# Patient Record
Sex: MALE | Race: WHITE | Employment: OTHER | URBAN - METROPOLITAN AREA
[De-identification: names, ages, dates, MRNs, and addresses within clinical notes are randomized per-mention and may not be internally consistent; named-entity substitution may affect disease eponyms.]

---

## 2017-01-01 ENCOUNTER — DOCUMENTATION ONLY (OUTPATIENT)
Dept: ENDOCRINOLOGY | Age: 82
End: 2017-01-01

## 2017-01-01 ENCOUNTER — OFFICE VISIT (OUTPATIENT)
Dept: ENDOCRINOLOGY | Age: 82
End: 2017-01-01

## 2017-01-01 VITALS
OXYGEN SATURATION: 97 % | TEMPERATURE: 97 F | RESPIRATION RATE: 16 BRPM | HEIGHT: 74 IN | HEART RATE: 63 BPM | SYSTOLIC BLOOD PRESSURE: 113 MMHG | DIASTOLIC BLOOD PRESSURE: 48 MMHG | BODY MASS INDEX: 31.28 KG/M2 | WEIGHT: 243.7 LBS

## 2017-01-01 DIAGNOSIS — Z79.4 TYPE 2 DIABETES MELLITUS WITH HYPERGLYCEMIA, WITH LONG-TERM CURRENT USE OF INSULIN (HCC): Primary | ICD-10-CM

## 2017-01-01 DIAGNOSIS — E11.65 TYPE 2 DIABETES MELLITUS WITH HYPERGLYCEMIA, WITH LONG-TERM CURRENT USE OF INSULIN (HCC): Primary | ICD-10-CM

## 2017-01-01 DIAGNOSIS — T14.8XXA ANIMAL BITE: ICD-10-CM

## 2017-01-01 DIAGNOSIS — N18.4 STAGE 4 CHRONIC KIDNEY DISEASE (HCC): ICD-10-CM

## 2017-01-01 DIAGNOSIS — M81.0 SENILE OSTEOPOROSIS: ICD-10-CM

## 2017-01-01 DIAGNOSIS — I73.9 PAD (PERIPHERAL ARTERY DISEASE) (HCC): ICD-10-CM

## 2017-01-01 LAB
HBA1C MFR BLD HPLC: 7.5 %
HBA1C MFR BLD HPLC: 7.9 %

## 2017-01-01 RX ORDER — CEPHALEXIN 250 MG/1
250 CAPSULE ORAL 3 TIMES DAILY
Qty: 21 CAP | Refills: 0 | Status: SHIPPED | OUTPATIENT
Start: 2017-01-01

## 2017-01-01 RX ORDER — PEN NEEDLE, DIABETIC 31 GX5/16"
NEEDLE, DISPOSABLE MISCELLANEOUS
Qty: 360 PEN NEEDLE | Refills: 21 | Status: SHIPPED | OUTPATIENT
Start: 2017-01-01

## 2017-01-01 RX ORDER — ERGOCALCIFEROL 1.25 MG/1
CAPSULE ORAL
Qty: 15 CAP | Refills: 0 | Status: SHIPPED | OUTPATIENT
Start: 2017-01-01 | End: 2018-01-01 | Stop reason: SDUPTHER

## 2017-01-01 RX ORDER — ERGOCALCIFEROL 1.25 MG/1
CAPSULE ORAL
Qty: 15 CAP | Refills: 0 | Status: SHIPPED | OUTPATIENT
Start: 2017-01-01 | End: 2017-01-01 | Stop reason: SDUPTHER

## 2017-01-01 RX ORDER — GABAPENTIN 100 MG/1
CAPSULE ORAL
Qty: 360 CAP | Refills: 4 | Status: SHIPPED | OUTPATIENT
Start: 2017-01-01

## 2017-01-01 RX ORDER — DULOXETIN HYDROCHLORIDE 30 MG/1
CAPSULE, DELAYED RELEASE ORAL
Qty: 90 CAP | Refills: 4 | Status: SHIPPED | OUTPATIENT
Start: 2017-01-01

## 2017-01-02 RX ORDER — DULOXETIN HYDROCHLORIDE 30 MG/1
CAPSULE, DELAYED RELEASE ORAL
Qty: 90 CAP | Refills: 1 | Status: SHIPPED | OUTPATIENT
Start: 2017-01-02 | End: 2017-01-01 | Stop reason: SDUPTHER

## 2017-01-04 ENCOUNTER — APPOINTMENT (OUTPATIENT)
Dept: GENERAL RADIOLOGY | Age: 82
End: 2017-01-04
Attending: PHYSICIAN ASSISTANT
Payer: MEDICARE

## 2017-01-04 ENCOUNTER — HOSPITAL ENCOUNTER (EMERGENCY)
Age: 82
Discharge: OTHER INSTITUTION | End: 2017-01-04
Attending: EMERGENCY MEDICINE
Payer: MEDICARE

## 2017-01-04 VITALS
SYSTOLIC BLOOD PRESSURE: 100 MMHG | DIASTOLIC BLOOD PRESSURE: 54 MMHG | BODY MASS INDEX: 31.85 KG/M2 | HEART RATE: 61 BPM | WEIGHT: 248.19 LBS | OXYGEN SATURATION: 96 % | RESPIRATION RATE: 17 BRPM | HEIGHT: 74 IN

## 2017-01-04 DIAGNOSIS — J96.02 ACUTE RESPIRATORY FAILURE WITH HYPOXIA AND HYPERCAPNIA (HCC): Primary | ICD-10-CM

## 2017-01-04 DIAGNOSIS — J96.01 ACUTE RESPIRATORY FAILURE WITH HYPOXIA AND HYPERCAPNIA (HCC): Primary | ICD-10-CM

## 2017-01-04 LAB
ALBUMIN SERPL BCP-MCNC: 3.6 G/DL (ref 3.5–5)
ALBUMIN/GLOB SERPL: 0.9 {RATIO} (ref 1.1–2.2)
ALP SERPL-CCNC: 75 U/L (ref 45–117)
ALT SERPL-CCNC: 34 U/L (ref 12–78)
ANION GAP BLD CALC-SCNC: 15 MMOL/L (ref 5–15)
APPEARANCE UR: CLEAR
APTT PPP: 31 SEC (ref 22.1–32.5)
AST SERPL W P-5'-P-CCNC: 35 U/L (ref 15–37)
ATRIAL RATE: 208 BPM
ATRIAL RATE: 87 BPM
BACTERIA URNS QL MICRO: ABNORMAL /HPF
BASOPHILS # BLD AUTO: 0.1 K/UL (ref 0–0.1)
BASOPHILS # BLD: 1 % (ref 0–1)
BILIRUB SERPL-MCNC: 0.7 MG/DL (ref 0.2–1)
BILIRUB UR QL: NEGATIVE
BUN SERPL-MCNC: 44 MG/DL (ref 6–20)
BUN/CREAT SERPL: 18 (ref 12–20)
CALCIUM SERPL-MCNC: 9 MG/DL (ref 8.5–10.1)
CALCULATED R AXIS, ECG10: 7 DEGREES
CALCULATED R AXIS, ECG10: 87 DEGREES
CALCULATED T AXIS, ECG11: -178 DEGREES
CALCULATED T AXIS, ECG11: 139 DEGREES
CHLORIDE SERPL-SCNC: 98 MMOL/L (ref 97–108)
CO2 SERPL-SCNC: 24 MMOL/L (ref 21–32)
COLOR UR: ABNORMAL
CREAT SERPL-MCNC: 2.42 MG/DL (ref 0.7–1.3)
DIAGNOSIS, 93000: NORMAL
DIAGNOSIS, 93000: NORMAL
DIFFERENTIAL METHOD BLD: ABNORMAL
EOSINOPHIL # BLD: 0.3 K/UL (ref 0–0.4)
EOSINOPHIL NFR BLD: 3 % (ref 0–7)
EPITH CASTS URNS QL MICRO: ABNORMAL /LPF
ERYTHROCYTE [DISTWIDTH] IN BLOOD BY AUTOMATED COUNT: 16 % (ref 11.5–14.5)
GLOBULIN SER CALC-MCNC: 4.2 G/DL (ref 2–4)
GLUCOSE BLD STRIP.AUTO-MCNC: 215 MG/DL (ref 65–100)
GLUCOSE SERPL-MCNC: 157 MG/DL (ref 65–100)
GLUCOSE UR STRIP.AUTO-MCNC: 250 MG/DL
HCT VFR BLD AUTO: 41.4 % (ref 36.6–50.3)
HGB BLD-MCNC: 12.8 G/DL (ref 12.1–17)
HGB UR QL STRIP: ABNORMAL
INR PPP: 1.3 (ref 0.9–1.1)
KETONES UR QL STRIP.AUTO: NEGATIVE MG/DL
LEUKOCYTE ESTERASE UR QL STRIP.AUTO: NEGATIVE
LYMPHOCYTES # BLD AUTO: 13 % (ref 12–49)
LYMPHOCYTES # BLD: 1.3 K/UL
MAGNESIUM SERPL-MCNC: 2.4 MG/DL (ref 1.6–2.4)
MCH RBC QN AUTO: 29.3 PG (ref 26–34)
MCHC RBC AUTO-ENTMCNC: 30.9 G/DL (ref 30–36.5)
MCV RBC AUTO: 94.7 FL (ref 80–99)
MONOCYTES # BLD: 0.9 K/UL (ref 0–1)
MONOCYTES NFR BLD AUTO: 9 % (ref 5–13)
NEUTS SEG # BLD: 7.6 K/UL (ref 1.8–8)
NEUTS SEG NFR BLD AUTO: 74 % (ref 32–75)
NITRITE UR QL STRIP.AUTO: NEGATIVE
PH UR STRIP: 6 [PH] (ref 5–8)
PLATELET # BLD AUTO: 225 K/UL (ref 150–400)
POTASSIUM SERPL-SCNC: 4.4 MMOL/L (ref 3.5–5.1)
PROT SERPL-MCNC: 7.8 G/DL (ref 6.4–8.2)
PROT UR STRIP-MCNC: >300 MG/DL
PROTHROMBIN TIME: 12.7 SEC (ref 9–11.1)
Q-T INTERVAL, ECG07: 356 MS
Q-T INTERVAL, ECG07: 456 MS
QRS DURATION, ECG06: 106 MS
QRS DURATION, ECG06: 124 MS
QTC CALCULATION (BEZET), ECG08: 468 MS
QTC CALCULATION (BEZET), ECG08: 478 MS
RBC # BLD AUTO: 4.37 M/UL (ref 4.1–5.7)
RBC #/AREA URNS HPF: ABNORMAL /HPF (ref 0–5)
SERVICE CMNT-IMP: ABNORMAL
SODIUM SERPL-SCNC: 137 MMOL/L (ref 136–145)
SP GR UR REFRACTOMETRY: 1.02 (ref 1–1.03)
THERAPEUTIC RANGE,PTTT: NORMAL SECS (ref 58–77)
TROPONIN I BLD-MCNC: <0.04 NG/ML (ref 0–0.08)
TROPONIN I SERPL-MCNC: 0.09 NG/ML
UA: UC IF INDICATED,UAUC: ABNORMAL
UROBILINOGEN UR QL STRIP.AUTO: 0.2 EU/DL (ref 0.2–1)
VENTRICULAR RATE, ECG03: 104 BPM
VENTRICULAR RATE, ECG03: 66 BPM
WBC # BLD AUTO: 10.1 K/UL (ref 4.1–11.1)
WBC URNS QL MICRO: ABNORMAL /HPF (ref 0–4)

## 2017-01-04 PROCEDURE — 85025 COMPLETE CBC W/AUTO DIFF WBC: CPT | Performed by: EMERGENCY MEDICINE

## 2017-01-04 PROCEDURE — C1751 CATH, INF, PER/CENT/MIDLINE: HCPCS

## 2017-01-04 PROCEDURE — 85730 THROMBOPLASTIN TIME PARTIAL: CPT | Performed by: EMERGENCY MEDICINE

## 2017-01-04 PROCEDURE — 93005 ELECTROCARDIOGRAM TRACING: CPT

## 2017-01-04 PROCEDURE — 77030019563 HC DEV ATTCH FEED HOLL -A

## 2017-01-04 PROCEDURE — 96365 THER/PROPH/DIAG IV INF INIT: CPT

## 2017-01-04 PROCEDURE — 99285 EMERGENCY DEPT VISIT HI MDM: CPT

## 2017-01-04 PROCEDURE — 94002 VENT MGMT INPAT INIT DAY: CPT

## 2017-01-04 PROCEDURE — 80053 COMPREHEN METABOLIC PANEL: CPT | Performed by: EMERGENCY MEDICINE

## 2017-01-04 PROCEDURE — 74011000258 HC RX REV CODE- 258: Performed by: EMERGENCY MEDICINE

## 2017-01-04 PROCEDURE — 87086 URINE CULTURE/COLONY COUNT: CPT | Performed by: EMERGENCY MEDICINE

## 2017-01-04 PROCEDURE — 74011250636 HC RX REV CODE- 250/636: Performed by: EMERGENCY MEDICINE

## 2017-01-04 PROCEDURE — 77030008683 HC TU ET CUF COVD -A

## 2017-01-04 PROCEDURE — 74011000250 HC RX REV CODE- 250: Performed by: EMERGENCY MEDICINE

## 2017-01-04 PROCEDURE — 77030034848

## 2017-01-04 PROCEDURE — 84484 ASSAY OF TROPONIN QUANT: CPT | Performed by: EMERGENCY MEDICINE

## 2017-01-04 PROCEDURE — 85610 PROTHROMBIN TIME: CPT | Performed by: EMERGENCY MEDICINE

## 2017-01-04 PROCEDURE — 31500 INSERT EMERGENCY AIRWAY: CPT

## 2017-01-04 PROCEDURE — 77030018729 HC ELECTRD DEFIB PAD CARD -B

## 2017-01-04 PROCEDURE — 71010 XR CHEST PORT: CPT

## 2017-01-04 PROCEDURE — 77030005546 HC CATH URETH FOL 3W BARD -A

## 2017-01-04 PROCEDURE — 96375 TX/PRO/DX INJ NEW DRUG ADDON: CPT

## 2017-01-04 PROCEDURE — 77030034850

## 2017-01-04 PROCEDURE — 82803 BLOOD GASES ANY COMBINATION: CPT

## 2017-01-04 PROCEDURE — 75810000137 HC PLCMT CENT VENOUS CATH

## 2017-01-04 PROCEDURE — 96367 TX/PROPH/DG ADDL SEQ IV INF: CPT

## 2017-01-04 PROCEDURE — 81001 URINALYSIS AUTO W/SCOPE: CPT | Performed by: EMERGENCY MEDICINE

## 2017-01-04 PROCEDURE — 77030008771 HC TU NG SALEM SUMP -A

## 2017-01-04 PROCEDURE — 83735 ASSAY OF MAGNESIUM: CPT | Performed by: EMERGENCY MEDICINE

## 2017-01-04 PROCEDURE — 82962 GLUCOSE BLOOD TEST: CPT

## 2017-01-04 PROCEDURE — 74011250636 HC RX REV CODE- 250/636

## 2017-01-04 RX ORDER — SODIUM CHLORIDE 9 MG/ML
125 INJECTION, SOLUTION INTRAVENOUS CONTINUOUS
Status: DISCONTINUED | OUTPATIENT
Start: 2017-01-04 | End: 2017-01-04 | Stop reason: HOSPADM

## 2017-01-04 RX ORDER — PROPOFOL 10 MG/ML
INJECTION, EMULSION INTRAVENOUS
Status: COMPLETED
Start: 2017-01-04 | End: 2017-01-04

## 2017-01-04 RX ORDER — MIDAZOLAM HYDROCHLORIDE 5 MG/ML
5 INJECTION INTRAMUSCULAR; INTRAVENOUS ONCE
Status: DISCONTINUED | OUTPATIENT
Start: 2017-01-04 | End: 2017-01-04 | Stop reason: HOSPADM

## 2017-01-04 RX ORDER — MIDAZOLAM HYDROCHLORIDE 5 MG/ML
5 INJECTION INTRAMUSCULAR; INTRAVENOUS ONCE
Status: COMPLETED | OUTPATIENT
Start: 2017-01-04 | End: 2017-01-04

## 2017-01-04 RX ORDER — SODIUM CHLORIDE 9 MG/ML
999 INJECTION, SOLUTION INTRAVENOUS ONCE
Status: DISCONTINUED | OUTPATIENT
Start: 2017-01-04 | End: 2017-01-04 | Stop reason: HOSPADM

## 2017-01-04 RX ORDER — MIDAZOLAM HYDROCHLORIDE 1 MG/ML
INJECTION, SOLUTION INTRAMUSCULAR; INTRAVENOUS
Status: COMPLETED
Start: 2017-01-04 | End: 2017-01-04

## 2017-01-04 RX ORDER — ROCURONIUM BROMIDE 10 MG/ML
100 INJECTION, SOLUTION INTRAVENOUS
Status: COMPLETED | OUTPATIENT
Start: 2017-01-04 | End: 2017-01-04

## 2017-01-04 RX ADMIN — PROPOFOL 20 MCG/KG/MIN: 10 INJECTION, EMULSION INTRAVENOUS at 14:22

## 2017-01-04 RX ADMIN — SODIUM CHLORIDE 1000 ML: 900 INJECTION, SOLUTION INTRAVENOUS at 14:27

## 2017-01-04 RX ADMIN — MIDAZOLAM HYDROCHLORIDE 2 MG/HR: 5 INJECTION, SOLUTION INTRAMUSCULAR; INTRAVENOUS at 15:35

## 2017-01-04 RX ADMIN — MIDAZOLAM HYDROCHLORIDE 5 MG: 5 INJECTION INTRAMUSCULAR; INTRAVENOUS at 14:54

## 2017-01-04 RX ADMIN — PROPOFOL 10 MCG/KG/MIN: 10 INJECTION, EMULSION INTRAVENOUS at 15:00

## 2017-01-04 RX ADMIN — SODIUM CHLORIDE 125 ML/HR: 900 INJECTION, SOLUTION INTRAVENOUS at 15:33

## 2017-01-04 RX ADMIN — MIDAZOLAM HYDROCHLORIDE 5 MG: 1 INJECTION, SOLUTION INTRAMUSCULAR; INTRAVENOUS at 14:26

## 2017-01-04 RX ADMIN — ROCURONIUM BROMIDE 100 MG: 10 INJECTION INTRAVENOUS at 16:06

## 2017-01-04 NOTE — ED NOTES
TRANSFER - OUT REPORT:    Verbal report given to NICK Camp with CCT(name) on Ramin Purdy  being transferred to [facility] ICU Rm 7(unit) for routine progression of care       Report consisted of patients Situation, Background, Assessment and   Recommendations(SBAR). Information from the following report(s) ED Summary was reviewed with the receiving nurse. Lines:   Triple Lumen 7fr TLC 01/04/17 Right Internal jugular (Active)   Central Line Being Utilized No 1/4/2017  2:42 PM   Site Assessment Clean, dry, & intact 1/4/2017  2:42 PM   Infiltration Assessment 0 1/4/2017  2:42 PM   Date of Last Dressing Change 01/04/17 1/4/2017  2:42 PM   Dressing Status Clean, dry, & intact 1/4/2017  2:42 PM   Positive Blood Return (Medial Site) Yes 1/4/2017  2:42 PM   Positive Blood Return (Lateral Site) Yes 1/4/2017  2:42 PM   Positive Blood Return (Site #3) Yes 1/4/2017  2:42 PM   External Catheter Length (cm) 24 centimeters 1/4/2017  2:42 PM       Peripheral IV 01/04/17 Right Forearm (Active)   Site Assessment Clean, dry, & intact 1/4/2017  2:21 PM   Phlebitis Assessment 0 1/4/2017  2:21 PM   Infiltration Assessment 0 1/4/2017  2:21 PM   Dressing Status Clean, dry, & intact 1/4/2017  2:21 PM   Dressing Type Transparent 1/4/2017  2:21 PM   Hub Color/Line Status Pink 1/4/2017  2:21 PM       Peripheral IV 01/04/17 Right Antecubital (Active)   Site Assessment Clean, dry, & intact 1/4/2017  2:22 PM   Phlebitis Assessment 0 1/4/2017  2:22 PM   Infiltration Assessment 0 1/4/2017  2:22 PM   Dressing Status Clean, dry, & intact 1/4/2017  2:22 PM        Opportunity for questions and clarification was provided.       Patient transported with:   Critical Care Transport Team

## 2017-01-04 NOTE — ED NOTES
TRANSFER - OUT REPORT:    Telephone Verbal report given to [facility] NICK MEEKS(name) on Rosio Purdy  being transferred to [facility] ICU RM 7(unit) for routine progression of care       Report consisted of patients Situation, Background, Assessment and   Recommendations(SBAR). Information from the following report(s) ED Summary was reviewed with the receiving nurse. Lines:   Triple Lumen 7fr TLC 01/04/17 Right Internal jugular (Active)   Central Line Being Utilized No 1/4/2017  2:42 PM   Site Assessment Clean, dry, & intact 1/4/2017  2:42 PM   Infiltration Assessment 0 1/4/2017  2:42 PM   Date of Last Dressing Change 01/04/17 1/4/2017  2:42 PM   Dressing Status Clean, dry, & intact 1/4/2017  2:42 PM   Positive Blood Return (Medial Site) Yes 1/4/2017  2:42 PM   Positive Blood Return (Lateral Site) Yes 1/4/2017  2:42 PM   Positive Blood Return (Site #3) Yes 1/4/2017  2:42 PM   External Catheter Length (cm) 24 centimeters 1/4/2017  2:42 PM       Peripheral IV 01/04/17 Right Forearm (Active)   Site Assessment Clean, dry, & intact 1/4/2017  2:21 PM   Phlebitis Assessment 0 1/4/2017  2:21 PM   Infiltration Assessment 0 1/4/2017  2:21 PM   Dressing Status Clean, dry, & intact 1/4/2017  2:21 PM   Dressing Type Transparent 1/4/2017  2:21 PM   Hub Color/Line Status Pink 1/4/2017  2:21 PM       Peripheral IV 01/04/17 Right Antecubital (Active)   Site Assessment Clean, dry, & intact 1/4/2017  2:22 PM   Phlebitis Assessment 0 1/4/2017  2:22 PM   Infiltration Assessment 0 1/4/2017  2:22 PM   Dressing Status Clean, dry, & intact 1/4/2017  2:22 PM        Opportunity for questions and clarification was provided.       Patient transported with:   Critical Care Transport Team

## 2017-01-04 NOTE — ED PROVIDER NOTES
HPI   79 yo WM presents with sob. Pt has severe COPD on 2L oxygen by NC 24/7. Pt was coming to Dr. Geovanni Arroyo (vascular surgery) office for surgery f/u. Per wife, pt was c/o sob this morning, then worsening as he was walking into the doctor's office. Pt was seen in the office, then became more short winded when walking to the car. Per wife, his oxygen tank ran out. They tried to replace it and get him in the car. Once in the car, he began with some shaking and sob, became unresponsive. No hx of trauma or injury. Pt pulled from car by ED nursing staff. Pt unresponsive upon arrival and unable to give any history. Past Medical History:   Diagnosis Date    Arthritis     BPH (benign prostatic hyperplasia)     CAD (coronary artery disease)     Chronic respiratory failure with hypoxia (HCC)      2L    CKD (chronic kidney disease), stage III     COPD (chronic obstructive pulmonary disease) (HCC)     Depression     Diastolic CHF, chronic (HCC)      triple bypass, 2002    DM type 2 causing neurological disease ([address]     DM type 2 causing renal disease ([address]     DM type 2 causing vascular disease ([address]     Essential hypertension     GERD (gastroesophageal reflux disease)     Hyperlipidemia     Nausea & vomiting     Neuropathy     KELSEA on CPAP     Osteoporosis 6/2011 and 12/2011     prolia shots    Pacemaker     Sick sinus syndrome [facility])      pacemaker 2010    Stroke [facility])      per Dr. Reid Payor note from 11/9/2016 \"TIA like episodes\" pradaxa increased.        Past Surgical History:   Procedure Laterality Date    Hx cataract removal      Hx tonsillectomy       child    Hx heent       surgery x3 right ear    Hx heart catheterization       triple bypass    Hx coronary artery bypass graft  03/2016     right leg, Dr. Teressa Zhou Hx cholecystectomy      Hx pacemaker  12/17/2010     Medtronic Dual Chamber pacemaker    Pr prostate biopsy, needle, saturation sampling      Hx orthopaedic       right hip, screws  Hx orthopaedic       reconstruction right ankle    Hx amputation Right 2016     4th toe right foot    Hx orthopaedic       hammertoe x2    Hx orthopaedic       screws in ankle         Family History:   Problem Relation Age of Onset    Diabetes Mother     Heart Attack Mother     Diabetes Father     Heart Attack Father     Diabetes Sister     Heart Attack Sister     Diabetes Brother     Heart Attack Brother     Diabetes Sister     Heart Attack Sister        Social History     Social History    Marital status:      Spouse name: N/A    Number of children: N/A    Years of education: N/A     Occupational History    Not on file.      Social History Main Topics    Smoking status: Former Smoker     Years: 40.00     Quit date: 1975    Smokeless tobacco: Never Used    Alcohol use No    Drug use: No    Sexual activity: Yes     Partners: Female     Other Topics Concern    Not on file     Social History Narrative         ALLERGIES: Pcn [penicillins]    Review of Systems   Unable to perform ROS: Intubated       Vitals:    01/04/17 1418 01/04/17 1421 01/04/17 1427 01/04/17 1430   BP: 169/88 (!) 171/126 114/70 (!) 133/91   Pulse: 93 (!) 106 81 83   Resp: 18 22 16 15   SpO2: 100% 93% 98% 99%            Physical Exam   Physical Examination: General appearance - unresponsive, perioral cyanosis  Eyes - pupils equal and reactive  Neck - supple, no significant adenopathy  Chest - agonal respirations, pacemaker left upper chest   Heart - unable to palpate pulse  Abdomen - obese, soft, nontender, nondistended  Neurological - unresponsive  Musculoskeletal - bandage to right foot c/d/i  Skin - cyanotic  MDM  Number of Diagnoses or Management Options  Acute respiratory failure with hypoxia and hypercapnia ([address]):      Amount and/or Complexity of Data Reviewed  Clinical lab tests: ordered and reviewed  Tests in the radiology section of CPT®: ordered and reviewed  Decide to obtain previous medical records or to obtain history from someone other than the patient: yes  Obtain history from someone other than the patient: yes (family)  Review and summarize past medical records: yes  Discuss the patient with other providers: yes (Cardiology, hospitalist)  Independent visualization of images, tracings, or specimens: yes    Critical Care  Total time providing critical care: 30-74 minutes    Patient Progress  Patient progress: improved    ED Course       INTUBATE PATIENT  Date/Time: 1/4/2017 2:17 PM  Performed by: Ger Vazquez  Authorized by: Ger Vazquez     Consent:     Consent obtained:  Emergent situation  Pre-procedure details:     Patient status:  Unresponsive    Pretreatment medications:  None    Paralytics:  None  Procedure details:     Preoxygenation:  Bag valve mask    CPR in progress: yes      Intubation method:  Oral    Oral intubation technique:  Direct    Laryngoscope blade: Mac 4    Tube size (mm):  7.5    Tube type:  Cuffed    Number of attempts:  1    Ventilation between attempts: yes      Cricoid pressure: no      Tube visualized through cords: yes    Placement assessment:     ETT to lip:  ET to gums 23    Tube secured with:  ETT barboza    Breath sounds:  Equal and absent over the epigastrium    Placement verification: chest rise, condensation, CXR verification, direct visualization, equal breath sounds and ETCO2 detector      CXR findings:  ETT in proper place  Post-procedure details:     Patient tolerance of procedure: Tolerated well, no immediate complications  CENTRAL VENOUS LINE INSERTION  Date/Time: 1/4/2017 2:45 PM  Performed by: Ger Vazquez  Authorized by: Michael Arevalo details:     Hand hygiene: Hand hygiene performed prior to insertion      Skin preparation:  ChloraPrep    Skin preparation agent: Skin preparation agent completely dried prior to procedure    Anesthesia (see MAR for exact dosages):      Anesthesia method:  Local infiltration    Local anesthetic: Lidocaine 1% w/o epi  Procedure details:     Location:  R internal jugular    Site selection rationale:  Pt on pradaxa, pacemaker left upper chest    Patient position:  Flat    Procedural supplies:  Triple lumen    Landmarks identified: yes      Ultrasound guidance: yes      Sterile ultrasound techniques: Sterile gel and sterile probe covers were used      Number of attempts:  1    Successful placement: yes    Post-procedure details:     Post-procedure:  Dressing applied and line sutured    Assessment:  Blood return through all ports, no pneumothorax on x-ray, free fluid flow and placement verified by x-ray    Patient tolerance of procedure: Tolerated well, no immediate complications      EKG interpretation: (Preliminary) 14:20  Rhythm: atrial fib; and irregular. Rate (approx.): 104; Axis: normal; slight ST elevation V2, t wave inversion V3-V6    EKG interpretation: (Preliminary) 15:08  Rhythm: atrial fib; and irregular. Rate (approx.): 66; Axis: normal; t wave inversion V2-V6, new from previous EKGs    Updated family on pt status. Family requests transfer to [facility] since pt is followed by cardiology and pulmonary there. Discussed with Dr. Regina Valencia, pt's cardiologist. Updated on pt's presentation and plan of care. Discussed with Dr. Elif Lugo, hospitalist at [address] [place] [address] pt for admission to ICU. [facility] critical care team at bedside, will transport pt to [facility].    Pt with respiratory acidosis due to copd. Sats 100% on vent. Vent settings adjusted to increase rate and decrease oxygen.

## 2017-01-04 NOTE — ED NOTES
Ventilator changes made based on ABG results. Central line placed by Dr Pooja Ordoñez using sterile technique. Pt remains sedated using Propofol.

## 2017-01-04 NOTE — ED TRIAGE NOTES
Pt received from POV hypoxic with agonal breathing. Immediate CPR initiated upon entering the treatment room. Dr Jason Orf immediately at bedside and assisting breathing via BVM.

## 2017-01-06 LAB
BACTERIA SPEC CULT: NORMAL
CC UR VC: NORMAL
SERVICE CMNT-IMP: NORMAL

## 2017-01-19 LAB — CREATININE, EXTERNAL: 1.72

## 2017-02-14 ENCOUNTER — OFFICE VISIT (OUTPATIENT)
Dept: ENDOCRINOLOGY | Age: 82
End: 2017-02-14

## 2017-02-14 VITALS
HEIGHT: 74 IN | BODY MASS INDEX: 30.67 KG/M2 | DIASTOLIC BLOOD PRESSURE: 57 MMHG | HEART RATE: 74 BPM | TEMPERATURE: 96.2 F | WEIGHT: 239 LBS | RESPIRATION RATE: 14 BRPM | OXYGEN SATURATION: 95 % | SYSTOLIC BLOOD PRESSURE: 121 MMHG

## 2017-02-14 DIAGNOSIS — M81.0 OSTEOPOROSIS, UNSPECIFIED: ICD-10-CM

## 2017-02-14 DIAGNOSIS — N18.30 CKD (CHRONIC KIDNEY DISEASE), STAGE III (HCC): ICD-10-CM

## 2017-02-14 DIAGNOSIS — J44.9 CHRONIC OBSTRUCTIVE PULMONARY DISEASE, UNSPECIFIED COPD TYPE (HCC): ICD-10-CM

## 2017-02-14 LAB
GLUCOSE POC: 132 MG/DL
HBA1C MFR BLD HPLC: 7.7 %

## 2017-02-14 RX ORDER — APIXABAN 5 MG/1
2.5 TABLET, FILM COATED ORAL
COMMUNITY
Start: 2017-02-09

## 2017-02-14 RX ORDER — NITROGLYCERIN 80 MG/1
PATCH TRANSDERMAL
Refills: 1 | COMMUNITY
Start: 2017-01-19

## 2017-02-14 NOTE — PROGRESS NOTES
prolia injection given in left arm.  Pt tolerated it well  Prolia 60 mg  [employer]   Lot [id number]  Exp 04/19  UlJeff Ayala 47 [phone]    Wt Readings from Last 3 Encounters:   02/14/17 239 lb (108.4 kg)   01/04/17 248 lb 3.1 oz (112.6 kg)   12/02/16 248 lb 3.2 oz (112.6 kg)     Temp Readings from Last 3 Encounters:   02/14/17 96.2 °F (35.7 °C) (Oral)   12/02/16 98.2 °F (36.8 °C)   11/23/16 97.7 °F (36.5 °C)     BP Readings from Last 3 Encounters:   02/14/17 121/57   01/04/17 100/54   12/02/16 113/56     Pulse Readings from Last 3 Encounters:   02/14/17 74   01/04/17 61   12/02/16 70     Lab Results   Component Value Date/Time    Hemoglobin A1c 7.1 11/16/2016 08:07 AM    Hemoglobin A1c (POC) 7.4 08/17/2016 09:54 AM

## 2017-02-14 NOTE — MR AVS SNAPSHOT
Visit Information Date & Time Provider Department Dept. Phone Encounter #  
 2/14/2017  9:15 AM Jaja Whaley MD [facility] [phone] [id number] Follow-up Instructions Return in about 6 months (around 8/14/2017) for visit and prolia. Upcoming Health Maintenance Date Due DTaP/Tdap/Td series (1 - Tdap) 3/24/1956 ZOSTER VACCINE AGE 60> 3/24/1995 GLAUCOMA SCREENING Q2Y 3/24/2000 MEDICARE YEARLY EXAM 3/24/2000 FOOT EXAM Q1 11/20/2015 EYE EXAM RETINAL OR DILATED Q1 6/4/2016 INFLUENZA AGE 9 TO ADULT 8/1/2016 HEMOGLOBIN A1C Q6M 5/16/2017 MICROALBUMIN Q1 11/16/2017 LIPID PANEL Q1 11/16/2017 Pneumococcal 65+ High/Highest Risk (2 of 2 - PPSV23) 11/12/2020 Allergies as of 2/14/2017  Review Complete On: 2/14/2017 By: Jaja Whaley MD  
  
 Severity Noted Reaction Type Reactions Pcn [Penicillins] Medium 09/24/2010    Hives Tolerates cephalexin Current Immunizations  Reviewed on 3/12/2016 Name Date Influenza Vaccine 10/12/2015 Pneumococcal Vaccine (Unspecified Type) 11/12/2015 Not reviewed this visit You Were Diagnosed With   
  
 Codes Comments Uncontrolled type 2 diabetes mellitus with complication, with long-term current use of insulin (HCC)    -  Primary ICD-10-CM: E11.8, E11.65, Z79.4 ICD-9-CM: 250.82, V58.67 Osteoporosis, unspecified     ICD-10-CM: M81.0 ICD-9-CM: 733.00 Vitals BP Pulse Temp Resp Height(growth percentile) Weight(growth percentile) 121/57 (BP 1 Location: Left arm, BP Patient Position: Sitting) 74 96.2 °F (35.7 °C) (Oral) 14 6' 2\" (1.88 m) 239 lb (108.4 kg) SpO2 BMI Smoking Status 95% 30.69 kg/m2 Former Smoker BMI and BSA Data Body Mass Index Body Surface Area  
 30.69 kg/m 2 2.38 m 2 Preferred Pharmacy Pharmacy Name [address] [facility] [phone] Your Updated Medication List  
  
   
This list is accurate as of: 2/14/17  9:48 AM.  Always use your most recent med list.  
  
  
  
  
 acetaminophen 500 mg tablet Commonly known as:  TYLENOL Take 1,000 mg by mouth three (3) times daily as needed for Pain. albuterol-ipratropium 2.5 mg-0.5 mg/3 ml Nebu Commonly known as:  DUO-NEB  
3 mL by Nebulization route every four (4) hours as needed. amLODIPine 2.5 mg tablet Commonly known as:  Horris Bills Take 2.5 mg by mouth daily (after dinner). aspirin delayed-release 81 mg tablet Take 81 mg by mouth daily (after breakfast). atorvastatin 10 mg tablet Commonly known as:  LIPITOR Take 10 mg by mouth nightly. BD INSULIN PEN NEEDLE UF SHORT 31 gauge x 5/16\" Ndle Generic drug:  Insulin Needles (Disposable) USE FOR INJECTIONS FOUR TIMES A DAY * bumetanide 1 mg tablet Commonly known as:  Arline Hung Take 1 mg by mouth daily (after breakfast). * bumetanide 1 mg tablet Commonly known as:  Arline Hung Take 1 mg by mouth daily as needed (fluid and swelling). BYSTOLIC 10 mg tablet Generic drug:  nebivolol Take 1 Tab by mouth daily. cephALEXin 500 mg capsule Commonly known as:  Larayne Marco Take 1 Cap by mouth three (3) times daily. DEXILANT 60 mg Cpdb Generic drug:  Dexlansoprazole Take 60 mg by mouth Daily (before breakfast). * DULoxetine 30 mg capsule Commonly known as:  CYMBALTA Take 30 mg by mouth daily (after dinner). * DULoxetine 30 mg capsule Commonly known as:  CYMBALTA TAKE 1 CAPSULE DAILY  
  
 ELIQUIS 5 mg tablet Generic drug:  apixaban  
  
 fenofibrate 54 mg tablet Commonly known as:  LOFIBRA TAKE 1 TABLET DAILY ferrous sulfate 325 mg (65 mg iron) tablet Take 325 mg by mouth daily (after breakfast). FLOMAX 0.4 mg capsule Generic drug:  tamsulosin Take 0.4 mg by mouth daily (after breakfast). gabapentin 100 mg capsule Commonly known as:  NEURONTIN Take 1 cap in morning, 1 cap in afternoon, and 2 caps at night  
  
 ibuprofen 600 mg tablet Commonly known as:  MOTRIN  
TAKE ONE TABLET BY MOUTH THREE TIMES DAILY  
  
 insulin glargine 100 unit/mL injection Commonly known as:  LANTUS INJECT 80 UNITS SUBCUTANEOUSLY TWICE DAILY  
  
 insulin lispro 100 unit/mL kwikpen Commonly known as:  HUMALOG Inject 18 units before breakfast and lunch, and 20 units before dinner. Plus sliding scale. Max daily dose 119 units * Insulin Syringe-Needle U-100 1 mL 31 gauge x 5/16 Syrg Commonly known as:  BD INSULIN SYRINGE ULT-FINE II  
USE WITH INSULIN TWICE DAILY. Dx code E11.65 * Insulin Syringe-Needle U-100 1 mL 31 gauge x 5/16 Syrg Commonly known as:  BD INSULIN SYRINGE ULT-FINE II  
USE WITH INSULIN ONCE DAILY. Dx code E11.65  
  
 * nitroglycerin 0.4 mg SL tablet Commonly known as:  NITROSTAT  
0.4 mg by SubLINGual route every five (5) minutes as needed for Chest Pain. * nitroglycerin 0.4 mg/hr Commonly known as:  NITRODUR  
APPLY ONE PATCH EVERY DAY (take off at bedtime) * oxyCODONE-acetaminophen 5-325 mg per tablet Commonly known as:  PERCOCET Take 1 Tab by mouth every four (4) hours as needed for Pain. Max Daily Amount: 6 Tabs. * oxyCODONE-acetaminophen 5-325 mg per tablet Commonly known as:  PERCOCET Take 1 Tab by mouth every four (4) hours as needed for Pain. Max Daily Amount: 6 Tabs. PROAIR HFA 90 mcg/actuation inhaler Generic drug:  albuterol Take 2 Puffs by inhalation every four (4) hours as needed for Wheezing or Shortness of Breath. PROLIA 60 mg/mL injection Generic drug:  denosumab SPIRIVA WITH HANDIHALER 18 mcg inhalation capsule Generic drug:  tiotropium Take 1 Cap by inhalation daily. SYMBICORT 160-4.5 mcg/actuation HFA inhaler Generic drug:  budesonide-formoterol Take 2 puffs by inhalation two (2) times a day. traMADol 50 mg tablet Commonly known as:  ULTRAM  
as needed. VITAMIN D2 50,000 unit capsule Generic drug:  ergocalciferol TAKE 1 CAPSULE EVERY 7 DAYS * Notice: This list has 10 medication(s) that are the same as other medications prescribed for you. Read the directions carefully, and ask your doctor or other care provider to review them with you. We Performed the Following AMB POC GLUCOSE, QUANTITATIVE, BLOOD [34741 CPT(R)] AMB POC HEMOGLOBIN A1C [15233 CPT(R)] Follow-up Instructions Return in about 6 months (around 8/14/2017) for visit and prolia. Patient Instructions Please call office if there is any injection site reactions like redness or swelling Call 911 or go to Emergency room if you are feeling dizzy and developing shortness of breath Check blood sugars before each  meal and at bed time Lantus insulin to 80  units twice a day Take humalog insulin 18 units before breakfast, 18 units before lunch and 20 units before dinner. Also, add additional humalog as follows with meals If blood sugars are[de-identified]  
150-200 mg 3 units 201-250 mg 6 units 251-300 mg 9 units 301-350 mg 12 units 351-400 mg 15 units 401-450 mg 18 units 451-500 mg 21 units Less than 70 mg NO INSULIN Please provide this summary of care documentation to your next provider. Your primary care clinician is listed as ANA MARÍA Lua. If you have any questions after today's visit, please call [phone].

## 2017-02-14 NOTE — PATIENT INSTRUCTIONS
Please call office if there is any injection site reactions like redness or swelling     Call 911 or go to Emergency room if you are feeling dizzy and developing shortness of breath          Check blood sugars before each  meal and at bed time     Lantus insulin to 80  units twice a day       Take humalog insulin 18 units before breakfast, 18 units before lunch and 20 units before dinner.      Also, add additional humalog as follows with meals If blood sugars are[de-identified]   150-200 mg 3 units   201-250 mg 6 units   251-300 mg 9 units   301-350 mg 12 units   351-400 mg 15 units   401-450 mg 18 units   451-500 mg 21 units   Less than 70 mg NO INSULIN

## 2017-02-14 NOTE — PROGRESS NOTES
HISTORY OF PRESENT ILLNESS   Jess Prieto is a 80 y.o. male. HPI   F/u for DM 2 and osteoporosis  management after last visit from Aug   2016       He had cardio-respiratory arrest on jan 4th and stayed at [address] [place] [address] for 4 days       He had toe amputated in the interim in oct 2016  He had right fifth metatarsel  In dec 2016     F/u for right foot ulcer   He is s/p amputated  4th toe on right foot  March 16 2016     He has better sugars noticeable some occasions   Weight is stable     Wife is accompanying who helps with insulin   On oxygen by N/C    He is off antibiotics now         Review of Systems   Constitutional: none  HENT: Positive for hearing loss. Eyes: Negative for pain and redness. Respiratory: Positive for shortness of breath. On o2 canula by nasal canula   Cardiovascular: Negative for chest pain, palpitations and leg swelling. Gastrointestinal: Negative. Negative for constipation. Genitourinary: Negative. Musculoskeletal: Negative for myalgias. Positive for eduma   Skin: Negative. Neurological: Positive for weakness. Endo/Heme/Allergies: Negative. Psychiatric/Behavioral: Negative for depression and memory loss. The patient does not have insomnia. Physical Exam   Constitutional: He is oriented to person, place, and time. He appears well-developed and well-nourished. HENT:   Head: Normocephalic. Eyes: Conjunctivae and extraocular motions are normal. Pupils are equal, round, and reactive to light. Neck: Normal range of motion. Neck supple. No JVD present. No tracheal deviation present. No thyromegaly present. Cardiovascular: Normal rate, regular rhythm and normal heart sounds. Pulmonary/Chest: Effort normal and breath sounds normal.   Abdominal: Soft. Bowel sounds are normal.   Musculoskeletal: Normal range of motion. Lymphadenopathy:   He has no cervical adenopathy. Neurological: He is alert and oriented to person, place, and time. He has normal reflexes.    Skin: Skin is warm. Diabetic feet exam : feb 2017     H/o partial or complete amputation of foot : yes  H/o previous foot ulceration : yes  H/o pre - ulcerative callus: yes  H/o peripheral neuropathy and callus: yes  H/o poor circulation     PARISA   : yes  Foot deformity : flat feet , hammer toes   absent fourth, fifth  toes on right side  - amputated March, oct , dec  2016   Healed /scabbed ulcer on right lateral aspect of foot         ASSESSMENT and PLAN     1. DM 2 un controlled : A1c is 7.7 %    From   Today Feb 2017   Compared to   7.4 %   From aug 2016  Compared to    8.3 %   From April 2016  Compared to   9.4 %    From march 24 2016   Compared to   9.9 %  From feb 2016  Compared to  7.4 %    From dec 2015  Compared to   8.4 %    From   June 2015 compared to  7.2 %     From dec 2014 compared to   7.4 %  From June 2014  Compared to  6.5 % dec 2013 compared to 6.9 % compared to 7.7 % from June 2012; 6.8 % from dec 2011 ; 7.3 % from 11/10/2011 ; 8.4 % from 8/2011 ; 6.8 % from 5/2011 compared to 6.9 % from 12/2010     continue current meds- basal bolus regimen and actos   Getting labs   Continue checking blood sugars 4 times a day     2. PAD  ; h/o  Diabetic ulcer   s/p recent amputation of right fourth toe , persisting with multiple infections         3. HTN : Continue on antihypertensive regimen- on norvasc   bumex is being given for fluid gain         3. 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 Advised low potassium diet       4. DPN -continue cymbalta       5. CAD s/p PCM -  Changed to eliquis    6. CKD - creat  Is 1.6   From April 2016      7. Osteoporosis : started on Prolia twice yearly. Prolia is brought by the patient. Eleventh shot given today    Date :jan 2014   Bone DEXA  ap spine T-score 3.7 ; left femoral Neck T- score  0.2, right femoral neck T-score-1.0  Date : jan 2016  Bone DEXA  ap spine T-score 5  left femoral Neck T- score  0.7, right femoral neck T-score 0.7      8.  Severe COPD ; o2 dependant       > 50 % of time is spent on counseling

## 2017-02-17 RX ORDER — ERGOCALCIFEROL 1.25 MG/1
CAPSULE ORAL
Qty: 15 CAP | Refills: 1 | Status: SHIPPED | OUTPATIENT
Start: 2017-02-17 | End: 2017-01-01 | Stop reason: SDUPTHER

## 2017-04-29 RX ORDER — FENOFIBRATE 54 MG/1
TABLET ORAL
Qty: 90 TAB | Refills: 4 | Status: SHIPPED | OUTPATIENT
Start: 2017-04-29

## 2017-05-16 DIAGNOSIS — E11.65 TYPE 2 DIABETES MELLITUS WITH HYPERGLYCEMIA, WITH LONG-TERM CURRENT USE OF INSULIN (HCC): ICD-10-CM

## 2017-05-16 DIAGNOSIS — Z79.4 TYPE 2 DIABETES MELLITUS WITH HYPERGLYCEMIA, WITH LONG-TERM CURRENT USE OF INSULIN (HCC): ICD-10-CM

## 2017-05-16 RX ORDER — INSULIN GLARGINE 100 [IU]/ML
INJECTION, SOLUTION SUBCUTANEOUS
Qty: 6 VIAL | Refills: 4 | Status: SHIPPED | OUTPATIENT
Start: 2017-05-16 | End: 2017-05-19 | Stop reason: SDUPTHER

## 2017-05-19 DIAGNOSIS — Z79.4 TYPE 2 DIABETES MELLITUS WITH HYPERGLYCEMIA, WITH LONG-TERM CURRENT USE OF INSULIN (HCC): ICD-10-CM

## 2017-05-19 DIAGNOSIS — E11.65 TYPE 2 DIABETES MELLITUS WITH HYPERGLYCEMIA, WITH LONG-TERM CURRENT USE OF INSULIN (HCC): ICD-10-CM

## 2017-05-19 RX ORDER — INSULIN GLARGINE 100 [IU]/ML
INJECTION, SOLUTION SUBCUTANEOUS
Qty: 18 VIAL | Refills: 4 | Status: SHIPPED | OUTPATIENT
Start: 2017-05-19 | End: 2018-01-01 | Stop reason: SDUPTHER

## 2017-05-25 RX ORDER — CALCIUM CARB/VITAMIN D3/VIT K1 500-100-40
TABLET,CHEWABLE ORAL
Qty: 200 SYRINGE | Refills: 4 | Status: SHIPPED | OUTPATIENT
Start: 2017-05-25 | End: 2017-05-26 | Stop reason: SDUPTHER

## 2017-05-26 NOTE — TELEPHONE ENCOUNTER
Please call in a box of 1 ml syringes to [facility] today please because the mail order will not ship until the 31st to [facility].  Thank you

## 2017-05-29 RX ORDER — CALCIUM CARB/VITAMIN D3/VIT K1 500-100-40
TABLET,CHEWABLE ORAL
Qty: 100 SYRINGE | Refills: 6 | Status: SHIPPED | OUTPATIENT
Start: 2017-05-29

## 2017-07-03 RX ORDER — DULOXETIN HYDROCHLORIDE 30 MG/1
CAPSULE, DELAYED RELEASE ORAL
Qty: 90 CAP | Refills: 0 | OUTPATIENT
Start: 2017-07-03

## 2017-12-06 NOTE — PROGRESS NOTES
HISTORY OF PRESENT ILLNESS   Jess Prieto is a 80 y.o. male. HPI   F/u for DM 2 and osteoporosis  management after last visit from Feb 2017       He had another cardio-respiratory arrest nov 2017  and stayed at [facility] for 4 days   He has been diagnosed with pulmonary HTN  he has been getting admitted frequently for heart failure     He has better sugars are fluctuant   He is on and off steroids     Weight is stable     Wife is accompanying who helps with insulin   On oxygen by N/C    He is off antibiotics now         Review of Systems   Constitutional: none  HENT: Positive for hearing loss. Eyes: Negative for pain and redness. Respiratory: Positive for shortness of breath. On o2 canula by nasal canula   Cardiovascular: Negative for chest pain, palpitations and leg swelling. Gastrointestinal: Negative. Negative for constipation. Genitourinary: Negative. Musculoskeletal: Negative for myalgias. Positive for eduma   Skin: Negative. Neurological: Positive for weakness. Endo/Heme/Allergies: Negative. Psychiatric/Behavioral: Negative for depression and memory loss. The patient does not have insomnia. Physical Exam   Constitutional: He is oriented to person, place, and time. He appears well-developed and well-nourished. HENT:   Head: Normocephalic. Eyes: Conjunctivae and extraocular motions are normal. Pupils are equal, round, and reactive to light. Neck: Normal range of motion. Neck supple. No JVD present. No tracheal deviation present. No thyromegaly present. Cardiovascular: Normal rate, regular rhythm and normal heart sounds. Pulmonary/Chest: Effort normal and breath sounds normal.   Abdominal: Soft. Bowel sounds are normal.   Musculoskeletal: Normal range of motion. Lymphadenopathy:   He has no cervical adenopathy. Neurological: He is alert and oriented to person, place, and time. He has normal reflexes. Skin: Skin is warm.      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 DM 2 un controlled : A1c is   7.9 %    Today from    Dec 2017   compared to 7.7 %    From   Today Feb 2017   Compared to   7.4 %   From aug 2016  Compared to    8.3 %   From April 2016  Compared to   9.4 %    From march 24 2016   Compared to   9.9 %  From feb 2016  Compared to  7.4 %    From dec 2015  Compared to   8.4 %    From   June 2015 compared to  7.2 %     From dec 2014 compared to   7.4 %  From June 2014  Compared to  6.5 % dec 2013 compared to 6.9 % compared to 7.7 % from June 2012; 6.8 % from dec 2011 ; 7.3 % from 11/10/2011 ; 8.4 % from 8/2011 ; 6.8 % from 5/2011 compared to 6.9 % from 12/2010     continue current meds- basal bolus regimen and actos   Getting labs   Continue checking blood sugars 4 times a day       2. 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 HTN : Continue on antihypertensive regimen- on norvasc   bumex is being given for fluid gain         3. DPN -continue cymbalta       4. CAD s/p PCM -  Changed to eliquis    5. CKD - creat  Is 2.26  The baseline   Moved up        6. Osteoporosis : started on Prolia twice yearly. Prolia is brought by the patient. Twelth shot given today    Date :jan 2014   Bone DEXA  ap spine T-score 3.7 ; left femoral Neck T- score  0.2, right femoral neck T-score-1.0  Date : jan 2016  Bone DEXA  ap spine T-score 5  left femoral Neck T- score  0.7, right femoral neck T-score 0.7      8.  Severe COPD ; o2 dependant       9.  Right finger animal tooth -  Will do augmentin       > 50 % of time is spent on counseling   Patient voiced understanding her plan of care

## 2017-12-06 NOTE — PATIENT INSTRUCTIONS
FLUID RESTRICTION      Check blood sugars before each  meal and at bed time       Lantus insulin to 80  units twice a day       Take humalog insulin 18 units before breakfast, 18 units before lunch and 20 units before dinner.      Also, add additional humalog as follows with meals If blood sugars are[de-identified]   150-200 mg 3 units   201-250 mg 6 units   251-300 mg 9 units   301-350 mg 12 units   351-400 mg 15 units   401-450 mg 18 units   451-500 mg 21 units   Less than 70 mg NO INSULIN

## 2017-12-06 NOTE — PROGRESS NOTES
Prolia injection given in right arm.  Pt tolerated it well  Prolia 60 mg  [employer]  Lot: [id number]   Exp: 02/19  Jeff Ayala 47: [id number]

## 2017-12-06 NOTE — PROGRESS NOTES
Chief Complaint   Patient presents with    Diabetes     1. Have you been to the ER, urgent care clinic since your last visit? Hospitalized since your last visit? 10/2017, [facility], respiratory failure    2. Have you seen or consulted any other health care providers outside of the [address] [place] [address] since your last visit? Include any pap smears or colon screening.  No    Wt Readings from Last 3 Encounters:   12/06/17 243 lb 11.2 oz (110.5 kg)   02/14/17 239 lb (108.4 kg)   01/04/17 248 lb 3.1 oz (112.6 kg)     Temp Readings from Last 3 Encounters:   12/06/17 97 °F (36.1 °C) (Oral)   02/14/17 96.2 °F (35.7 °C) (Oral)   12/02/16 98.2 °F (36.8 °C)     BP Readings from Last 3 Encounters:   12/06/17 113/48   02/14/17 121/57   01/04/17 100/54     Pulse Readings from Last 3 Encounters:   12/06/17 63   02/14/17 74   01/04/17 61     Pt has log book  Pt had foot and eye exam done this year    Pt on 5L of 02

## 2017-12-06 NOTE — MR AVS SNAPSHOT
Visit Information Date & Time Provider Department Dept. Phone Encounter #  
 12/6/2017  9:15 AM Ling Luis MD [facility] [phone] [id number] Follow-up Instructions Return in about 6 months (around 6/6/2018). Upcoming Health Maintenance Date Due DTaP/Tdap/Td series (1 - Tdap) 3/24/1956 ZOSTER VACCINE AGE 60> 1/24/1995 MEDICARE YEARLY EXAM 3/24/2000 FOOT EXAM Q1 11/20/2015 Influenza Age 5 to Adult 8/1/2017 HEMOGLOBIN A1C Q6M 3/8/2018 MICROALBUMIN Q1 9/8/2018 LIPID PANEL Q1 9/8/2018 EYE EXAM RETINAL OR DILATED Q1 9/12/2018 GLAUCOMA SCREENING Q2Y 9/12/2019 Pneumococcal 65+ Low/Medium Risk (2 of 2 - PPSV23) 11/12/2020 Allergies as of 12/6/2017  Review Complete On: 12/6/2017 By: Ling Luis MD  
  
 Severity Noted Reaction Type Reactions Pcn [Penicillins] Medium 09/24/2010    Hives Tolerates cephalexin Current Immunizations  Reviewed on 3/12/2016 Name Date Influenza Vaccine 10/12/2015 Pneumococcal Vaccine (Unspecified Type) 11/12/2015 Not reviewed this visit You Were Diagnosed With   
  
 Codes Comments Type 2 diabetes mellitus with hyperglycemia, with long-term current use of insulin (HCC)    -  Primary ICD-10-CM: E11.65, Z79.4 ICD-9-CM: 250.00, 790.29, V58.67 Vitals BP Pulse Temp Resp Height(growth percentile) Weight(growth percentile) 113/48 (BP 1 Location: Left arm, BP Patient Position: Sitting) 63 97 °F (36.1 °C) (Oral) 16 6' 2\" (1.88 m) 243 lb 11.2 oz (110.5 kg) SpO2 BMI Smoking Status 97% 31.29 kg/m2 Former Smoker BMI and BSA Data Body Mass Index Body Surface Area  
 31.29 kg/m 2 2.4 m 2 Preferred Pharmacy Pharmacy Name [address] [facility] [phone] Your Updated Medication List  
  
   
This list is accurate as of: 12/6/17  9:45 AM.  Always use your most recent med list.  
  
  
  
  
 acetaminophen 500 mg tablet Commonly known as:  TYLENOL Take 1,000 mg by mouth three (3) times daily as needed for Pain. albuterol-ipratropium 2.5 mg-0.5 mg/3 ml Nebu Commonly known as:  DUO-NEB  
3 mL by Nebulization route every four (4) hours as needed. amLODIPine 2.5 mg tablet Commonly known as:  Catherine Anderson Take 2.5 mg by mouth daily (after dinner). aspirin delayed-release 81 mg tablet Take 81 mg by mouth daily (after breakfast). atorvastatin 10 mg tablet Commonly known as:  LIPITOR Take 10 mg by mouth nightly. BD INSULIN PEN NEEDLE UF SHORT 31 gauge x 5/16\" Ndle Generic drug:  Insulin Needles (Disposable) USE FOR INJECTIONS FOUR TIMES A DAY * bumetanide 1 mg tablet Commonly known as:  1010 [employer] BirMis Descuentos Take 1 mg by mouth daily (after breakfast). * bumetanide 1 mg tablet Commonly known as:  1010 [employer] Take 1 mg by mouth daily as needed (fluid and swelling). BYSTOLIC 10 mg tablet Generic drug:  nebivolol Take 1 Tab by mouth daily. cephALEXin 500 mg capsule Commonly known as:  Deatrice Morgan Take 1 Cap by mouth three (3) times daily. DEXILANT 60 mg Cpdb Generic drug:  Dexlansoprazole Take 60 mg by mouth Daily (before breakfast). * DULoxetine 30 mg capsule Commonly known as:  CYMBALTA Take 30 mg by mouth daily (after dinner). * DULoxetine 30 mg capsule Commonly known as:  CYMBALTA TAKE 1 CAPSULE DAILY  
  
 ELIQUIS 5 mg tablet Generic drug:  apixaban 2.5 mg.  
  
 fenofibrate 54 mg tablet Commonly known as:  LOFIBRA TAKE 1 TABLET DAILY ferrous sulfate 325 mg (65 mg iron) tablet Take 325 mg by mouth daily (after breakfast). FLOMAX 0.4 mg capsule Generic drug:  tamsulosin Take 0.4 mg by mouth daily (after breakfast). gabapentin 100 mg capsule Commonly known as:  NEURONTIN Take 1 cap in morning, 1 cap in afternoon, and 2 caps at night ibuprofen 600 mg tablet Commonly known as:  MOTRIN  
TAKE ONE TABLET BY MOUTH THREE TIMES DAILY  
  
 insulin glargine 100 unit/mL injection Commonly known as:  LANTUS INJECT 80 UNITS SUBCUTANEOUSLY TWICE DAILY  
  
 insulin lispro 100 unit/mL kwikpen Commonly known as:  HUMALOG Inject 18 units before breakfast and lunch, and 20 units before dinner. Plus sliding scale. Max daily dose 119 units * Insulin Syringe-Needle U-100 1 mL 31 gauge x 5/16 Syrg Commonly known as:  BD INSULIN SYRINGE ULT-FINE II  
USE WITH INSULIN ONCE DAILY. Dx code E11.65 * Insulin Syringe-Needle U-100 1 mL 31 gauge x 5/16 Syrg Commonly known as:  BD INSULIN SYRINGE ULT-FINE II  
USE WITH INSULIN TWICE DAILY. Dx code E11.65  
  
 * nitroglycerin 0.4 mg SL tablet Commonly known as:  NITROSTAT  
0.4 mg by SubLINGual route every five (5) minutes as needed for Chest Pain. * nitroglycerin 0.4 mg/hr Commonly known as:  NITRODUR  
APPLY ONE PATCH EVERY DAY (take off at bedtime) * oxyCODONE-acetaminophen 5-325 mg per tablet Commonly known as:  PERCOCET Take 1 Tab by mouth every four (4) hours as needed for Pain. Max Daily Amount: 6 Tabs. * oxyCODONE-acetaminophen 5-325 mg per tablet Commonly known as:  PERCOCET Take 1 Tab by mouth every four (4) hours as needed for Pain. Max Daily Amount: 6 Tabs. PROAIR HFA 90 mcg/actuation inhaler Generic drug:  albuterol Take 2 Puffs by inhalation every four (4) hours as needed for Wheezing or Shortness of Breath. PROLIA 60 mg/mL injection Generic drug:  denosumab SPIRIVA WITH HANDIHALER 18 mcg inhalation capsule Generic drug:  tiotropium Take 1 Cap by inhalation daily. SYMBICORT 160-4.5 mcg/actuation Hfaa Generic drug:  budesonide-formoterol Take 2 puffs by inhalation two (2) times a day. traMADol 50 mg tablet Commonly known as:  ULTRAM  
as needed. VITAMIN D2 50,000 unit capsule Generic drug:  ergocalciferol TAKE 1 CAPSULE EVERY 7 DAYS * Notice: This list has 10 medication(s) that are the same as other medications prescribed for you. Read the directions carefully, and ask your doctor or other care provider to review them with you. We Performed the Following AMB POC HEMOGLOBIN A1C [97462 CPT(R)] Follow-up Instructions Return in about 6 months (around 6/6/2018). Patient Instructions FLUID RESTRICTION Check blood sugars before each  meal and at bed time Lantus insulin to 80  units twice a day Take humalog insulin 18 units before breakfast, 18 units before lunch and 20 units before dinner. Also, add additional humalog as follows with meals If blood sugars are[de-identified]  
150-200 mg 3 units 201-250 mg 6 units 251-300 mg 9 units 301-350 mg 12 units 351-400 mg 15 units 401-450 mg 18 units 451-500 mg 21 units Less than 70 mg NO INSULIN Introducing [facility] & [facility] SERVICES! Dear Osvaldo Terry: Thank you for requesting a [employer] account. Our records indicate that you have previously registered for a [employer] account but its currently inactive. Please call our [employer] support line at [phone]. Additional Information If you have questions, please visit the Frequently Asked Questions section of the [employer] website at [email or web address] Remember, [employer] is NOT to be used for urgent needs. For medical emergencies, dial 911. Now available from your iPhone and Android! Please provide this summary of care documentation to your next provider. Your primary care clinician is listed as David Way. If you have any questions after today's visit, please call [phone].

## 2018-01-01 ENCOUNTER — OFFICE VISIT (OUTPATIENT)
Dept: ENDOCRINOLOGY | Age: 83
End: 2018-01-01

## 2018-01-01 ENCOUNTER — HOSPITAL ENCOUNTER (OUTPATIENT)
Dept: MAMMOGRAPHY | Age: 83
Discharge: HOME | End: 2018-06-29
Attending: INTERNAL MEDICINE
Payer: MEDICARE

## 2018-01-01 ENCOUNTER — TELEPHONE (OUTPATIENT)
Dept: ENDOCRINOLOGY | Age: 83
End: 2018-01-01

## 2018-01-01 VITALS
HEIGHT: 74 IN | WEIGHT: 243.7 LBS | HEART RATE: 62 BPM | OXYGEN SATURATION: 96 % | TEMPERATURE: 97.6 F | RESPIRATION RATE: 18 BRPM | BODY MASS INDEX: 31.28 KG/M2 | DIASTOLIC BLOOD PRESSURE: 58 MMHG | SYSTOLIC BLOOD PRESSURE: 128 MMHG

## 2018-01-01 DIAGNOSIS — E11.65 TYPE 2 DIABETES MELLITUS WITH HYPERGLYCEMIA, WITH LONG-TERM CURRENT USE OF INSULIN (HCC): ICD-10-CM

## 2018-01-01 DIAGNOSIS — I73.9 PAD (PERIPHERAL ARTERY DISEASE) (HCC): ICD-10-CM

## 2018-01-01 DIAGNOSIS — Z79.4 TYPE 2 DIABETES MELLITUS WITH HYPERGLYCEMIA, WITH LONG-TERM CURRENT USE OF INSULIN (HCC): ICD-10-CM

## 2018-01-01 DIAGNOSIS — I10 ESSENTIAL HYPERTENSION: ICD-10-CM

## 2018-01-01 DIAGNOSIS — M81.0 SENILE OSTEOPOROSIS: ICD-10-CM

## 2018-01-01 DIAGNOSIS — M81.0 SENILE OSTEOPOROSIS: Primary | ICD-10-CM

## 2018-01-01 LAB
CREATININE, EXTERNAL: 2.11
CREATININE, EXTERNAL: 2.45
CREATININE, EXTERNAL: 2.47
CREATININE, EXTERNAL: 2.5
HBA1C MFR BLD HPLC: 7.3 %
HBA1C MFR BLD HPLC: 7.7 %
HBA1C MFR BLD HPLC: 7.9 %
HBA1C MFR BLD HPLC: 8.2 %

## 2018-01-01 PROCEDURE — 77080 DXA BONE DENSITY AXIAL: CPT

## 2018-01-01 RX ORDER — INSULIN GLARGINE 100 [IU]/ML
INJECTION, SOLUTION SUBCUTANEOUS
Qty: 180 ML | Refills: 4 | Status: SHIPPED | OUTPATIENT
Start: 2018-01-01

## 2018-01-01 RX ORDER — AZITHROMYCIN 250 MG/1
250 TABLET, FILM COATED ORAL DAILY
COMMUNITY

## 2018-01-01 RX ORDER — PREDNISONE 5 MG/1
TABLET ORAL
COMMUNITY

## 2018-04-25 NOTE — TELEPHONE ENCOUNTER
Recently released from [facility] for COPD. Home health nurse Antonio Fuller [phone] says Dr. Anjali Mak ordered monthly lab draw on this patient. Antonio Fuller wants to know if we should resume monthly  Lab.

## 2018-06-06 NOTE — MR AVS SNAPSHOT
[address] [id number] 
[phone] Patient: Jimi Purdy MRN: [id number] NMR:[id number] Visit Information Date & Time Provider Department Dept. Phone Encounter #  
 6/6/2018  9:00 AM Justyna Marmolejo MD [facility] [phone] [id number] Follow-up Instructions Return in about 6 months (around 12/6/2018) for prolia . Upcoming Health Maintenance Date Due DTaP/Tdap/Td series (1 - Tdap) 3/24/1956 ZOSTER VACCINE AGE 60> 1/24/1995 FOOT EXAM Q1 11/20/2015 MEDICARE YEARLY EXAM 3/14/2018 Influenza Age 5 to Adult 8/1/2018 MICROALBUMIN Q1 9/8/2018 LIPID PANEL Q1 9/8/2018 EYE EXAM RETINAL OR DILATED Q1 9/12/2018 HEMOGLOBIN A1C Q6M 11/1/2018 GLAUCOMA SCREENING Q2Y 9/12/2019 Pneumococcal 65+ Low/Medium Risk (2 of 2 - PPSV23) 11/12/2020 Allergies as of 6/6/2018  Review Complete On: 6/6/2018 By: Justyna Marmolejo MD  
  
 Severity Noted Reaction Type Reactions Pcn [Penicillins] Medium 09/24/2010    Hives Tolerates cephalexin Current Immunizations  Reviewed on 3/12/2016 Name Date Influenza Vaccine 10/12/2015 Pneumococcal Vaccine (Unspecified Type) 11/12/2015 Not reviewed this visit You Were Diagnosed With   
  
 Codes Comments Senile osteoporosis    -  Primary ICD-10-CM: M81.0 ICD-9-CM: 733.01 Vitals BP Pulse Temp Resp Height(growth percentile) Weight(growth percentile) 128/58 (BP 1 Location: Left arm, BP Patient Position: Sitting) 62 97.6 °F (36.4 °C) (Oral) 18 6' 2\" (1.88 m) 243 lb 11.2 oz (110.5 kg) SpO2 BMI Smoking Status 96% 31.29 kg/m2 Former Smoker Vitals History BMI and BSA Data Body Mass Index Body Surface Area  
 31.29 kg/m 2 2.4 m 2 Preferred Pharmacy Pharmacy Name Phone [address] [facility] [phone] Your Updated Medication List  
  
   
This list is accurate as of 6/6/18  9:53 AM.  Always use your most recent med list.  
  
  
  
  
 acetaminophen 500 mg tablet Commonly known as:  TYLENOL Take 1,000 mg by mouth three (3) times daily as needed for Pain. albuterol-ipratropium 2.5 mg-0.5 mg/3 ml Nebu Commonly known as:  DUO-NEB  
3 mL by Nebulization route every four (4) hours as needed. amLODIPine 2.5 mg tablet Commonly known as:  Sierra Susy Take 2.5 mg by mouth daily (after dinner). aspirin delayed-release 81 mg tablet Take 81 mg by mouth daily (after breakfast). atorvastatin 10 mg tablet Commonly known as:  LIPITOR Take 10 mg by mouth nightly. azithromycin 250 mg tablet Commonly known as:  [place] Dec Take 250 mg by mouth daily. BD ULTRA-FINE SHORT PEN NEEDLE 31 gauge x 5/16\" Ndle Generic drug:  Insulin Needles (Disposable) USE FOR INJECTIONS FOUR TIMES A DAY * bumetanide 1 mg tablet Commonly known as:  Faisal Newburgh Take 1 mg by mouth daily (after breakfast). * bumetanide 1 mg tablet Commonly known as:  Faisal Newburgh Take 1 mg by mouth daily as needed (fluid and swelling). BYSTOLIC 10 mg tablet Generic drug:  nebivolol Take 1 Tab by mouth daily. cephALEXin 250 mg capsule Commonly known as:  Paz Awkward Take 1 Cap by mouth three (3) times daily. DEXILANT 60 mg Cpdb Generic drug:  Dexlansoprazole Take 60 mg by mouth Daily (before breakfast). * DULoxetine 30 mg capsule Commonly known as:  CYMBALTA Take 30 mg by mouth daily (after dinner). * DULoxetine 30 mg capsule Commonly known as:  CYMBALTA TAKE 1 CAPSULE DAILY  
  
 ELIQUIS 5 mg tablet Generic drug:  apixaban 2.5 mg.  
  
 fenofibrate 54 mg tablet Commonly known as:  LOFIBRA TAKE 1 TABLET DAILY ferrous sulfate 325 mg (65 mg iron) tablet Take 325 mg by mouth daily (after breakfast). FLOMAX 0.4 mg capsule Generic drug:  tamsulosin Take 0.4 mg by mouth daily (after breakfast). gabapentin 100 mg capsule Commonly known as:  NEURONTIN Take 1 cap in morning, 1 cap in afternoon, and 2 caps at night  
  
 ibuprofen 600 mg tablet Commonly known as:  MOTRIN  
TAKE ONE TABLET BY MOUTH THREE TIMES DAILY  
  
 insulin glargine 100 unit/mL injection Commonly known as:  LANTUS U-100 INSULIN INJECT 80 UNITS SUBCUTANEOUSLY TWICE DAILY  
  
 insulin lispro 100 unit/mL kwikpen Commonly known as:  HUMALOG Inject 18 units before breakfast and lunch, and 20 units before dinner. Plus sliding scale. Max daily dose 119 units * Insulin Syringe-Needle U-100 1 mL 31 gauge x 5/16 Syrg Commonly known as:  BD INSULIN SYRINGE ULT-FINE II  
USE WITH INSULIN ONCE DAILY. Dx code E11.65 * Insulin Syringe-Needle U-100 1 mL 31 gauge x 5/16 Syrg Commonly known as:  BD INSULIN SYRINGE ULT-FINE II  
USE WITH INSULIN TWICE DAILY. Dx code E11.65  
  
 * nitroglycerin 0.4 mg SL tablet Commonly known as:  NITROSTAT  
0.4 mg by SubLINGual route every five (5) minutes as needed for Chest Pain. * nitroglycerin 0.4 mg/hr Commonly known as:  NITRODUR  
APPLY ONE PATCH EVERY DAY (take off at bedtime) * oxyCODONE-acetaminophen 5-325 mg per tablet Commonly known as:  PERCOCET Take 1 Tab by mouth every four (4) hours as needed for Pain. Max Daily Amount: 6 Tabs. * oxyCODONE-acetaminophen 5-325 mg per tablet Commonly known as:  PERCOCET Take 1 Tab by mouth every four (4) hours as needed for Pain. Max Daily Amount: 6 Tabs. predniSONE 5 mg tablet Commonly known as:  Alcario Shires Take  by mouth. PROAIR HFA 90 mcg/actuation inhaler Generic drug:  albuterol Take 2 Puffs by inhalation every four (4) hours as needed for Wheezing or Shortness of Breath. * PROLIA 60 mg/mL injection Generic drug:  denosumab * denosumab 60 mg/mL injection Commonly known as:  Erlinda Kaplan 1 mL by SubCUTAneous route once for 1 dose. SPIRIVA WITH HANDIHALER 18 mcg inhalation capsule Generic drug:  tiotropium Take 1 Cap by inhalation daily. SYMBICORT 160-4.5 mcg/actuation Hfaa Generic drug:  budesonide-formoterol Take 2 puffs by inhalation two (2) times a day. traMADol 50 mg tablet Commonly known as:  ULTRAM  
as needed. VITAMIN D2 50,000 unit capsule Generic drug:  ergocalciferol TAKE 1 CAPSULE EVERY 7 DAYS * Notice: This list has 12 medication(s) that are the same as other medications prescribed for you. Read the directions carefully, and ask your doctor or other care provider to review them with you. We Performed the Following THER/PROPH/DIAG INJECTION, SUBCUT/IM [id number] CPT(R)] Follow-up Instructions Return in about 6 months (around 12/6/2018) for prolia . Patient Instructions   
-------------------------------------------------------------------------------------------- Refills    -    please call your pharmacy and have them send us a refill request 
 
Results  -  allow up to a week for lab results to be processed and reviewed. Phone calls  -  Allow upto 24 hrs. for non-urgent calls to be retained Prior authorization - It may take up to 2 weeks to process, depending on your insurance Forms  -  FMLA, DMV, patient assistance, etc. will take up to 2 weeks to process Cancellations - please notify the office in advance if you cannot keep your appointment Samples  - will only be dispensed at visits as supply is limited If you are having a medical emergency call 911 
 
-------------------------------------------------------------------------------------------- 
 
FLUID RESTRICTION Check blood sugars before each  meal and at bed time Lantus insulin to 80  units twice a day ( dncp) Take humalog insulin 12 units before breakfast, 14  units before lunch and 20 units before dinner. (DNCP ) Also, add additional humalog as follows with meals If blood sugars are[de-identified]  
150-200 mg 3 units 201-250 mg 6 units 251-300 mg 9 units 301-350 mg 12 units 351-400 mg 15 units 401-450 mg 18 units 451-500 mg 21 units Less than 70 mg NO INSULIN  
 
-------------------------------------------------------------------------------------------------------------- Please call office if there is any injection site reactions like redness or swelling Call 911 or go to Emergency room if you are feeling dizzy and developing shortness of breath Introducing [facility]! [employer] introduces [employer] patient portal. Now you can access parts of your medical record, email your doctor's office, and request medication refills online. 1. In your internet browser, go to [email or web address] 2. Click on the First Time User? Click Here link in the Sign In box. You will see the New Member Sign Up page. 3. Enter your [employer] Access Code exactly as it appears below. You will not need to use this code after youve completed the sign-up process. If you do not sign up before the expiration date, you must request a new code. · [employer] Access Code: [email or web address] Expires: 9/4/2018  9:53 AM 
 
4. Enter the last four digits of your Social Security Number (xxxx) and Date of Birth (mm/dd/yyyy) as indicated and click Submit. You will be taken to the next sign-up page. 5. Create a Cymphonixt ID. This will be your [employer] login ID and cannot be changed, so think of one that is secure and easy to remember. 6. Create a [employer] password. You can change your password at any time. 7. Enter your Password Reset Question and Answer. This can be used at a later time if you forget your password. 8. Enter your e-mail address. You will receive e-mail notification when new information is available in [address] 9. Click Sign Up. You can now view and download portions of your medical record. 10. Click the Download Summary menu link to download a portable copy of your medical information. If you have questions, please visit the Frequently Asked Questions section of the [employer] website. Remember, [employer] is NOT to be used for urgent needs. For medical emergencies, dial 911. Now available from your iPhone and Android! Please provide this summary of care documentation to your next provider. Your primary care clinician is listed as Yudelka Rinaldi. If you have any questions after today's visit, please call [phone].

## 2018-06-06 NOTE — PROGRESS NOTES
HISTORY OF PRESENT ILLNESS   Jacky Gaspar is a 80 y.o. male. HPI   F/u for DM 2 and osteoporosis  management after last visit from [facility] 2017       Pt had frequent hospitalizations for angina and SOB   He is now on 5 mg a day of prednisone       He is also NG patch also   He has been feeling better       Log is reviewed         Old history       He had another cardio-respiratory arrest nov 2017  and stayed at [facility] for 4 days   He has been diagnosed with pulmonary HTN  he has been getting admitted frequently for heart failure     He has better sugars are fluctuant   He is on and off steroids     Weight is stable     Wife is accompanying who helps with insulin   On oxygen by N/C    He is off antibiotics now         Review of Systems   Constitutional: none  HENT: Positive for hearing loss. Eyes: Negative for pain and redness. Respiratory: Positive for shortness of breath. On o2 canula by nasal canula   Cardiovascular: Negative for chest pain, palpitations and leg swelling. Gastrointestinal: Negative. Negative for constipation. Genitourinary: Negative. Musculoskeletal: Negative for myalgias. Positive for eduma   Skin: Negative. Neurological: Positive for weakness. Endo/Heme/Allergies: Negative. Psychiatric/Behavioral: Negative for depression and memory loss. The patient does not have insomnia. Physical Exam   Constitutional: He is oriented to person, place, and time. He appears well-developed and well-nourished. HENT:   Head: Normocephalic. Eyes: Conjunctivae and extraocular motions are normal. Pupils are equal, round, and reactive to light. Neck: Normal range of motion. Neck supple. No JVD present. No tracheal deviation present. No thyromegaly present. Cardiovascular: Normal rate, regular rhythm and normal heart sounds. Pulmonary/Chest: Effort normal and breath sounds normal.   Abdominal: Soft. Bowel sounds are normal.   Musculoskeletal: Normal range of motion.    Lymphadenopathy: He has no cervical adenopathy. Neurological: He is alert and oriented to person, place, and time. He has normal reflexes. Skin: Skin is warm. 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  H/o previous foot ulceration : yes  H/o pre - ulcerative callus: yes  H/o peripheral neuropathy and callus: yes  H/o poor circulation     PARISA   : yes  Foot deformity : flat feet , hammer toes         Diabetic foot exam:  June 2018    Left Foot:   Visual Exam: normal    Pulse DP: 1+   Filament test: absent sensation    Vibratory sensation: absent      Right Foot:   Visual Exam: amputation- absent fourth, fifth  toes on right side  - amputated March, oct , dec  2016    Pulse DP: 1+ (weak)   Filament test: absent sensation    Vibratory sensation: absent            Reviewed labs from nov 2017       ASSESSMENT and PLAN     1. DM 2 un controlled : A1c is  7.9 %   From may 2018   Compared to 8.4 %   7.9 %    Today from    Dec 2017   compared to 7.7 %    From   Today Feb 2017   Compared to   7.4 %   From aug 2016  Compared to    8.3 %   From April 2016  Compared to   9.4 %    From march 24 2016   Compared to   9.9 %  From feb 2016  Compared to  7.4 %    From dec 2015  Compared to   8.4 %    From   June 2015 compared to  7.2 %     From dec 2014 compared to   7.4 %  From June 2014  Compared to  6.5 % dec 2013 compared to 6.9 % compared to 7.7 % from June 2012; 6.8 % from dec 2011 ; 7.3 % from 11/10/2011 ; 8.4 % from 8/2011 ; 6.8 % from 5/2011 compared to 6.9 % from 12/2010     Glycemic control is okay  Patient's wife takes care of his insulin regimen very well adjusting it to his needs. The both are educated today about the effect of prednisone on the blood sugars    continue current meds- basal bolus regimen and actos   Getting labs done once every month  Continue checking blood sugars 4 times a day       2.  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 HTN : Continue on antihypertensive regimen- on norvasc   bumex is being given for fluid gain         3. DPN -continue cymbalta       4. CAD s/p PCM -  Changed to eliquis    5. CKD - creat  Is   Stable   At gfr at 30- 37 ml/min         6. Osteoporosis : started on Prolia twice yearly. Prolia is brought by the patient. Thirteenth shot given today      The patient's wife expressed that she would like to not continue with the Prolia shots anymore but because there is an increased tendency to fracture more upon discontinuation of Prolia I am not very inclined to stop it   Moreover patient is now taking prednisone every day which can increase bone loss  Await DEXA    Date :jan 2014   Bone DEXA  ap spine T-score 3.7 ; left femoral Neck T- score  0.2, right femoral neck T-score-1.0  Date : jan 2016  Bone DEXA  ap spine T-score 5  left femoral Neck T- score  0.7, right femoral neck T-score 0.7  Due for DEXA       8.  Severe COPD ; o2 dependant           > 50 % of time is spent on counseling   Patient voiced understanding her plan of care

## 2018-06-06 NOTE — PATIENT INSTRUCTIONS
--------------------------------------------------------------------------------------------    Refills    -    please call your pharmacy and have them send us a refill request    Results  -  allow up to a week for lab results to be processed and reviewed. Phone calls  -  Allow upto 24 hrs. for non-urgent calls to be retained    Prior authorization - It may take up to 2 weeks to process, depending on your insurance    Forms  -  FMLA, DMV, patient assistance, etc. will take up to 2 weeks to process    Cancellations - please notify the office in advance if you cannot keep your appointment    Samples  - will only be dispensed at visits as supply is limited      If you are having a medical emergency call 911    --------------------------------------------------------------------------------------------    FLUID RESTRICTION      Check blood sugars before each  meal and at bed time       Lantus insulin to 80  units twice a day ( dncp)      Take humalog insulin 12 units before breakfast, 14  units before lunch and 20 units before dinner.  (DNCP )     Also, add additional humalog as follows with meals If blood sugars are[de-identified]   150-200 mg 3 units   201-250 mg 6 units   251-300 mg 9 units   301-350 mg 12 units   351-400 mg 15 units   401-450 mg 18 units   451-500 mg 21 units   Less than 70 mg NO INSULIN     --------------------------------------------------------------------------------------------------------------        Please call office if there is any injection site reactions like redness or swelling     Call 911 or go to Emergency room if you are feeling dizzy and developing shortness of breath

## 2018-06-06 NOTE — PROGRESS NOTES
Wt Readings from Last 3 Encounters:   06/06/18 243 lb 11.2 oz (110.5 kg)   12/06/17 243 lb 11.2 oz (110.5 kg)   02/14/17 239 lb (108.4 kg)     Temp Readings from Last 3 Encounters:   06/06/18 97.6 °F (36.4 °C) (Oral)   12/06/17 97 °F (36.1 °C) (Oral)   02/14/17 96.2 °F (35.7 °C) (Oral)     BP Readings from Last 3 Encounters:   06/06/18 128/58   12/06/17 113/48   02/14/17 121/57     Pulse Readings from Last 3 Encounters:   06/06/18 62   12/06/17 63   02/14/17 74     Lab Results   Component Value Date/Time    Hemoglobin A1c 7.1 (H) 09/08/2017 08:25 AM    Hemoglobin A1c (POC) 7.9 12/06/2017 09:38 AM    Hemoglobin A1c, External 8.4 05/01/2018     Patient has logbook today.

## 2018-06-06 NOTE — PROGRESS NOTES
Prolia injection given in left arm. Pt tolerated it well.   Prolia 60 mg  [employer]  Lot [id number]  Exp 08/20  . [facility] 47 [phone]

## 2018-07-11 NOTE — PROGRESS NOTES
Patient's bone quality is a bit worse ( but he never had any significant low numbers in the past either )    He was declining the prolia further   I am okay with it